# Patient Record
Sex: MALE | Race: WHITE | NOT HISPANIC OR LATINO | URBAN - METROPOLITAN AREA
[De-identification: names, ages, dates, MRNs, and addresses within clinical notes are randomized per-mention and may not be internally consistent; named-entity substitution may affect disease eponyms.]

---
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
## 2025-05-30 ENCOUNTER — TELEPHONE (OUTPATIENT)
Dept: FAMILY MEDICINE CLINIC | Facility: CLINIC | Age: 20
End: 2025-05-30

## 2025-05-30 NOTE — TELEPHONE ENCOUNTER
PERSON CLAIMING TO BE FROM [facility] CALLED ASKING FOR PT'S INS INFO. LOOKED UP CALL BACK # IN SYSTEM AND HE STARTED WITH A TOTALLY DIFFERENT AREA CODE AND THEN GAVE DIFF PHONE # WHICH CAME UP WITH SCAM REPORTS. INFO WILL NOT BE GIVEN TO THEM. MGR TOLD PERSON THAT REQUESTS FROM [facility] TYPICALLY ARE FAXED AND HE WOULD NOT DO THAT, KEPT WANTING IT TO BE GIVEN OVER THE PHONE.